# Patient Record
Sex: FEMALE | Race: OTHER | NOT HISPANIC OR LATINO | URBAN - METROPOLITAN AREA
[De-identification: names, ages, dates, MRNs, and addresses within clinical notes are randomized per-mention and may not be internally consistent; named-entity substitution may affect disease eponyms.]

---

## 2018-12-27 ENCOUNTER — OUTPATIENT (OUTPATIENT)
Dept: OUTPATIENT SERVICES | Facility: HOSPITAL | Age: 53
LOS: 1 days | End: 2018-12-27
Payer: COMMERCIAL

## 2018-12-27 VITALS
OXYGEN SATURATION: 99 % | HEIGHT: 67 IN | WEIGHT: 227.96 LBS | TEMPERATURE: 98 F | RESPIRATION RATE: 17 BRPM | HEART RATE: 79 BPM | DIASTOLIC BLOOD PRESSURE: 83 MMHG | SYSTOLIC BLOOD PRESSURE: 125 MMHG

## 2018-12-27 DIAGNOSIS — F17.200 NICOTINE DEPENDENCE, UNSPECIFIED, UNCOMPLICATED: ICD-10-CM

## 2018-12-27 DIAGNOSIS — M17.11 UNILATERAL PRIMARY OSTEOARTHRITIS, RIGHT KNEE: ICD-10-CM

## 2018-12-27 DIAGNOSIS — Z01.818 ENCOUNTER FOR OTHER PREPROCEDURAL EXAMINATION: ICD-10-CM

## 2018-12-27 DIAGNOSIS — Z90.49 ACQUIRED ABSENCE OF OTHER SPECIFIED PARTS OF DIGESTIVE TRACT: Chronic | ICD-10-CM

## 2018-12-27 DIAGNOSIS — E66.9 OBESITY, UNSPECIFIED: ICD-10-CM

## 2018-12-27 DIAGNOSIS — Z98.890 OTHER SPECIFIED POSTPROCEDURAL STATES: Chronic | ICD-10-CM

## 2018-12-27 LAB
HBA1C BLD-MCNC: 5.6 % — SIGNIFICANT CHANGE UP (ref 4–5.6)
MRSA PCR RESULT.: SIGNIFICANT CHANGE UP
S AUREUS DNA NOSE QL NAA+PROBE: SIGNIFICANT CHANGE UP

## 2018-12-27 PROCEDURE — G0463: CPT

## 2018-12-27 PROCEDURE — 86850 RBC ANTIBODY SCREEN: CPT

## 2018-12-27 PROCEDURE — 87641 MR-STAPH DNA AMP PROBE: CPT

## 2018-12-27 PROCEDURE — 83036 HEMOGLOBIN GLYCOSYLATED A1C: CPT

## 2018-12-27 PROCEDURE — 86900 BLOOD TYPING SEROLOGIC ABO: CPT

## 2018-12-27 PROCEDURE — 36415 COLL VENOUS BLD VENIPUNCTURE: CPT

## 2018-12-27 PROCEDURE — 86901 BLOOD TYPING SEROLOGIC RH(D): CPT

## 2018-12-27 PROCEDURE — 87640 STAPH A DNA AMP PROBE: CPT

## 2018-12-27 RX ORDER — SODIUM CHLORIDE 9 MG/ML
3 INJECTION INTRAMUSCULAR; INTRAVENOUS; SUBCUTANEOUS EVERY 8 HOURS
Qty: 0 | Refills: 0 | Status: DISCONTINUED | OUTPATIENT
Start: 2019-01-10 | End: 2019-01-12

## 2018-12-27 NOTE — H&P PST ADULT - NSANTHOSAYNRD_GEN_A_CORE
No. ROSALIE screening performed.  STOP BANG Legend: 0-2 = LOW Risk; 3-4 = INTERMEDIATE Risk; 5-8 = HIGH Risk

## 2018-12-27 NOTE — H&P PST ADULT - HISTORY OF PRESENT ILLNESS
53 year old female in generally good health obese presents with right knee osteoarthritis. Smoker and obesity presents with an injury at work in April 2016 her right knee while lifting heavy object heard a click and was unable to move. Pt used a cart to support herself and had ace wrapped for support. Pt went to ER in [place] ([facility]) had injection and referred to orthopedist. Pt than had different therapy treatment: injections, acupuncture gel, and physical therapy. Later on 12/2016 had arthroscopy on her right knee with relief for only a brief time. Pt had a repetition of the various therapies again with not much improvement. Pt was told of a knee replacement due to arthritis. Pt schedule for right knee replacement on 1/10/2019.

## 2019-01-10 ENCOUNTER — INPATIENT (INPATIENT)
Facility: HOSPITAL | Age: 54
LOS: 1 days | Discharge: HOME HEALTH | DRG: 470 | End: 2019-01-12
Attending: ORTHOPAEDIC SURGERY | Admitting: ORTHOPAEDIC SURGERY
Payer: COMMERCIAL

## 2019-01-10 VITALS
RESPIRATION RATE: 17 BRPM | OXYGEN SATURATION: 98 % | SYSTOLIC BLOOD PRESSURE: 141 MMHG | HEART RATE: 83 BPM | TEMPERATURE: 98 F | WEIGHT: 227.96 LBS | HEIGHT: 67 IN | DIASTOLIC BLOOD PRESSURE: 76 MMHG

## 2019-01-10 DIAGNOSIS — Z90.49 ACQUIRED ABSENCE OF OTHER SPECIFIED PARTS OF DIGESTIVE TRACT: Chronic | ICD-10-CM

## 2019-01-10 DIAGNOSIS — M17.11 UNILATERAL PRIMARY OSTEOARTHRITIS, RIGHT KNEE: ICD-10-CM

## 2019-01-10 DIAGNOSIS — E66.9 OBESITY, UNSPECIFIED: ICD-10-CM

## 2019-01-10 DIAGNOSIS — Z98.890 OTHER SPECIFIED POSTPROCEDURAL STATES: Chronic | ICD-10-CM

## 2019-01-10 DIAGNOSIS — F17.200 NICOTINE DEPENDENCE, UNSPECIFIED, UNCOMPLICATED: ICD-10-CM

## 2019-01-10 DIAGNOSIS — Z01.818 ENCOUNTER FOR OTHER PREPROCEDURAL EXAMINATION: ICD-10-CM

## 2019-01-10 LAB
ANION GAP SERPL CALC-SCNC: 8 MMOL/L — SIGNIFICANT CHANGE UP (ref 5–17)
BUN SERPL-MCNC: 8 MG/DL — SIGNIFICANT CHANGE UP (ref 7–18)
CALCIUM SERPL-MCNC: 8.5 MG/DL — SIGNIFICANT CHANGE UP (ref 8.4–10.5)
CHLORIDE SERPL-SCNC: 110 MMOL/L — HIGH (ref 96–108)
CO2 SERPL-SCNC: 27 MMOL/L — SIGNIFICANT CHANGE UP (ref 22–31)
CREAT SERPL-MCNC: 0.88 MG/DL — SIGNIFICANT CHANGE UP (ref 0.5–1.3)
GLUCOSE SERPL-MCNC: 82 MG/DL — SIGNIFICANT CHANGE UP (ref 70–99)
HCT VFR BLD CALC: 38.8 % — SIGNIFICANT CHANGE UP (ref 34.5–45)
HGB BLD-MCNC: 12.4 G/DL — SIGNIFICANT CHANGE UP (ref 11.5–15.5)
MCHC RBC-ENTMCNC: 31 PG — SIGNIFICANT CHANGE UP (ref 27–34)
MCHC RBC-ENTMCNC: 32 GM/DL — SIGNIFICANT CHANGE UP (ref 32–36)
MCV RBC AUTO: 96.9 FL — SIGNIFICANT CHANGE UP (ref 80–100)
PLATELET # BLD AUTO: 265 K/UL — SIGNIFICANT CHANGE UP (ref 150–400)
POTASSIUM SERPL-MCNC: 4.1 MMOL/L — SIGNIFICANT CHANGE UP (ref 3.5–5.3)
POTASSIUM SERPL-SCNC: 4.1 MMOL/L — SIGNIFICANT CHANGE UP (ref 3.5–5.3)
RBC # BLD: 4.01 M/UL — SIGNIFICANT CHANGE UP (ref 3.8–5.2)
RBC # FLD: 12.4 % — SIGNIFICANT CHANGE UP (ref 10.3–14.5)
SODIUM SERPL-SCNC: 145 MMOL/L — SIGNIFICANT CHANGE UP (ref 135–145)
WBC # BLD: 10.7 K/UL — HIGH (ref 3.8–10.5)
WBC # FLD AUTO: 10.7 K/UL — HIGH (ref 3.8–10.5)

## 2019-01-10 PROCEDURE — 73562 X-RAY EXAM OF KNEE 3: CPT | Mod: 26,RT

## 2019-01-10 PROCEDURE — 88311 DECALCIFY TISSUE: CPT | Mod: 26

## 2019-01-10 PROCEDURE — 88307 TISSUE EXAM BY PATHOLOGIST: CPT | Mod: 26

## 2019-01-10 RX ORDER — TRANEXAMIC ACID 100 MG/ML
1000 INJECTION, SOLUTION INTRAVENOUS ONCE
Qty: 0 | Refills: 0 | Status: DISCONTINUED | OUTPATIENT
Start: 2019-01-10 | End: 2019-01-10

## 2019-01-10 RX ORDER — GABAPENTIN 400 MG/1
100 CAPSULE ORAL ONCE
Qty: 0 | Refills: 0 | Status: COMPLETED | OUTPATIENT
Start: 2019-01-10 | End: 2019-01-10

## 2019-01-10 RX ORDER — FOLIC ACID 0.8 MG
1 TABLET ORAL DAILY
Qty: 0 | Refills: 0 | Status: DISCONTINUED | OUTPATIENT
Start: 2019-01-10 | End: 2019-01-12

## 2019-01-10 RX ORDER — SODIUM CHLORIDE 9 MG/ML
1000 INJECTION INTRAMUSCULAR; INTRAVENOUS; SUBCUTANEOUS
Qty: 0 | Refills: 0 | Status: DISCONTINUED | OUTPATIENT
Start: 2019-01-10 | End: 2019-01-12

## 2019-01-10 RX ORDER — ACETAMINOPHEN 500 MG
1000 TABLET ORAL ONCE
Qty: 0 | Refills: 0 | Status: COMPLETED | OUTPATIENT
Start: 2019-01-11 | End: 2019-01-11

## 2019-01-10 RX ORDER — TRAMADOL HYDROCHLORIDE 50 MG/1
50 TABLET ORAL ONCE
Qty: 0 | Refills: 0 | Status: DISCONTINUED | OUTPATIENT
Start: 2019-01-10 | End: 2019-01-10

## 2019-01-10 RX ORDER — OXYCODONE HYDROCHLORIDE 5 MG/1
5 TABLET ORAL EVERY 4 HOURS
Qty: 0 | Refills: 0 | Status: DISCONTINUED | OUTPATIENT
Start: 2019-01-10 | End: 2019-01-12

## 2019-01-10 RX ORDER — ONDANSETRON 8 MG/1
4 TABLET, FILM COATED ORAL EVERY 6 HOURS
Qty: 0 | Refills: 0 | Status: DISCONTINUED | OUTPATIENT
Start: 2019-01-10 | End: 2019-01-12

## 2019-01-10 RX ORDER — CELECOXIB 200 MG/1
200 CAPSULE ORAL ONCE
Qty: 0 | Refills: 0 | Status: COMPLETED | OUTPATIENT
Start: 2019-01-10 | End: 2019-01-10

## 2019-01-10 RX ORDER — DOCUSATE SODIUM 100 MG
100 CAPSULE ORAL THREE TIMES A DAY
Qty: 0 | Refills: 0 | Status: DISCONTINUED | OUTPATIENT
Start: 2019-01-10 | End: 2019-01-12

## 2019-01-10 RX ORDER — CEFAZOLIN SODIUM 1 G
1000 VIAL (EA) INJECTION EVERY 8 HOURS
Qty: 0 | Refills: 0 | Status: COMPLETED | OUTPATIENT
Start: 2019-01-10 | End: 2019-01-11

## 2019-01-10 RX ORDER — ASCORBIC ACID 60 MG
500 TABLET,CHEWABLE ORAL
Qty: 0 | Refills: 0 | Status: DISCONTINUED | OUTPATIENT
Start: 2019-01-10 | End: 2019-01-12

## 2019-01-10 RX ORDER — CHLORHEXIDINE GLUCONATE 213 G/1000ML
1 SOLUTION TOPICAL DAILY
Qty: 0 | Refills: 0 | Status: DISCONTINUED | OUTPATIENT
Start: 2019-01-10 | End: 2019-01-10

## 2019-01-10 RX ORDER — HYDROMORPHONE HYDROCHLORIDE 2 MG/ML
0.5 INJECTION INTRAMUSCULAR; INTRAVENOUS; SUBCUTANEOUS EVERY 4 HOURS
Qty: 0 | Refills: 0 | Status: DISCONTINUED | OUTPATIENT
Start: 2019-01-10 | End: 2019-01-12

## 2019-01-10 RX ORDER — TRAMADOL HYDROCHLORIDE 50 MG/1
50 TABLET ORAL EVERY 8 HOURS
Qty: 0 | Refills: 0 | Status: DISCONTINUED | OUTPATIENT
Start: 2019-01-10 | End: 2019-01-12

## 2019-01-10 RX ORDER — FERROUS SULFATE 325(65) MG
325 TABLET ORAL
Qty: 0 | Refills: 0 | Status: DISCONTINUED | OUTPATIENT
Start: 2019-01-10 | End: 2019-01-12

## 2019-01-10 RX ORDER — ACETAMINOPHEN 500 MG
1000 TABLET ORAL ONCE
Qty: 0 | Refills: 0 | Status: DISCONTINUED | OUTPATIENT
Start: 2019-01-10 | End: 2019-01-10

## 2019-01-10 RX ORDER — ENOXAPARIN SODIUM 100 MG/ML
30 INJECTION SUBCUTANEOUS EVERY 12 HOURS
Qty: 0 | Refills: 0 | Status: DISCONTINUED | OUTPATIENT
Start: 2019-01-11 | End: 2019-01-12

## 2019-01-10 RX ORDER — FAMOTIDINE 10 MG/ML
20 INJECTION INTRAVENOUS EVERY 12 HOURS
Qty: 0 | Refills: 0 | Status: DISCONTINUED | OUTPATIENT
Start: 2019-01-10 | End: 2019-01-12

## 2019-01-10 RX ORDER — SODIUM CHLORIDE 9 MG/ML
1000 INJECTION, SOLUTION INTRAVENOUS
Qty: 0 | Refills: 0 | Status: DISCONTINUED | OUTPATIENT
Start: 2019-01-10 | End: 2019-01-10

## 2019-01-10 RX ORDER — ACETAMINOPHEN 500 MG
650 TABLET ORAL EVERY 6 HOURS
Qty: 0 | Refills: 0 | Status: DISCONTINUED | OUTPATIENT
Start: 2019-01-10 | End: 2019-01-12

## 2019-01-10 RX ORDER — KETOROLAC TROMETHAMINE 30 MG/ML
15 SYRINGE (ML) INJECTION ONCE
Qty: 0 | Refills: 0 | Status: DISCONTINUED | OUTPATIENT
Start: 2019-01-10 | End: 2019-01-10

## 2019-01-10 RX ADMIN — HYDROMORPHONE HYDROCHLORIDE 0.5 MILLIGRAM(S): 2 INJECTION INTRAMUSCULAR; INTRAVENOUS; SUBCUTANEOUS at 22:14

## 2019-01-10 RX ADMIN — SODIUM CHLORIDE 120 MILLILITER(S): 9 INJECTION INTRAMUSCULAR; INTRAVENOUS; SUBCUTANEOUS at 20:07

## 2019-01-10 RX ADMIN — FAMOTIDINE 20 MILLIGRAM(S): 10 INJECTION INTRAVENOUS at 18:17

## 2019-01-10 RX ADMIN — GABAPENTIN 100 MILLIGRAM(S): 400 CAPSULE ORAL at 11:00

## 2019-01-10 RX ADMIN — Medication 1 MILLIGRAM(S): at 18:17

## 2019-01-10 RX ADMIN — Medication 100 MILLIGRAM(S): at 22:46

## 2019-01-10 RX ADMIN — CELECOXIB 200 MILLIGRAM(S): 200 CAPSULE ORAL at 10:59

## 2019-01-10 RX ADMIN — Medication 1 TABLET(S): at 18:17

## 2019-01-10 RX ADMIN — SODIUM CHLORIDE 3 MILLILITER(S): 9 INJECTION INTRAMUSCULAR; INTRAVENOUS; SUBCUTANEOUS at 22:15

## 2019-01-10 RX ADMIN — Medication 325 MILLIGRAM(S): at 18:17

## 2019-01-10 RX ADMIN — HYDROMORPHONE HYDROCHLORIDE 0.5 MILLIGRAM(S): 2 INJECTION INTRAMUSCULAR; INTRAVENOUS; SUBCUTANEOUS at 20:03

## 2019-01-10 RX ADMIN — OXYCODONE HYDROCHLORIDE 5 MILLIGRAM(S): 5 TABLET ORAL at 18:16

## 2019-01-10 RX ADMIN — SODIUM CHLORIDE 75 MILLILITER(S): 9 INJECTION, SOLUTION INTRAVENOUS at 15:50

## 2019-01-10 RX ADMIN — TRAMADOL HYDROCHLORIDE 50 MILLIGRAM(S): 50 TABLET ORAL at 11:00

## 2019-01-10 RX ADMIN — TRAMADOL HYDROCHLORIDE 50 MILLIGRAM(S): 50 TABLET ORAL at 23:56

## 2019-01-10 RX ADMIN — CHLORHEXIDINE GLUCONATE 1 APPLICATION(S): 213 SOLUTION TOPICAL at 11:00

## 2019-01-10 RX ADMIN — SODIUM CHLORIDE 3 MILLILITER(S): 9 INJECTION INTRAMUSCULAR; INTRAVENOUS; SUBCUTANEOUS at 10:54

## 2019-01-10 RX ADMIN — Medication 100 MILLIGRAM(S): at 22:45

## 2019-01-10 RX ADMIN — OXYCODONE HYDROCHLORIDE 5 MILLIGRAM(S): 5 TABLET ORAL at 19:04

## 2019-01-10 RX ADMIN — Medication 500 MILLIGRAM(S): at 18:17

## 2019-01-10 RX ADMIN — TRAMADOL HYDROCHLORIDE 50 MILLIGRAM(S): 50 TABLET ORAL at 22:46

## 2019-01-10 NOTE — PHYSICAL THERAPY INITIAL EVALUATION ADULT - ACTIVE RANGE OF MOTION EXAMINATION, REHAB EVAL
bilateral  lower extremity Active ROM was WFL (within functional limits)/bilateral upper extremity Active ROM was WFL (within functional limits)/R knee flex 0 degrees

## 2019-01-10 NOTE — PHYSICAL THERAPY INITIAL EVALUATION ADULT - DIAGNOSIS, PT EVAL
Patient presents with decreased strength, endurance, balance and increased pain that limits ability to perform all functional mobility

## 2019-01-10 NOTE — PHYSICAL THERAPY INITIAL EVALUATION ADULT - LIVES WITH, PROFILE
children/like with son and daughter in first floor apartment with 3 steps to enter and 3 steps inside children/lives with son and daughter in first floor apartment with 3 steps to enter and 3 steps inside

## 2019-01-10 NOTE — PHYSICAL THERAPY INITIAL EVALUATION ADULT - CRITERIA FOR SKILLED THERAPEUTIC INTERVENTIONS
functional limitations in following categories/rehab potential/predicted duration of therapy intervention/impairments found/therapy frequency/anticipated discharge recommendation

## 2019-01-10 NOTE — PHYSICAL THERAPY INITIAL EVALUATION ADULT - RANGE OF MOTION EXAMINATION, REHAB EVAL
bilateral upper extremity ROM was WFL (within functional limits)/bilateral lower extremity ROM was WFL (within functional limits)/R knee flex 0

## 2019-01-10 NOTE — PROGRESS NOTE ADULT - SUBJECTIVE AND OBJECTIVE BOX
Patient was seen and examined  Patient is a 53y old  Female who presents with a chief complaint of r knee pain     INTERVAL HPI/OVERNIGHT EVENTS:  T(C): 36.3 (01-10-19 @ 16:42), Max: 36.4 (01-10-19 @ 10:40)  HR: 86 (01-10-19 @ 18:04) (79 - 86)  BP: 148/80 (01-10-19 @ 18:04) (112/64 - 148/80)  RR: 16 (01-10-19 @ 18:04) (14 - 18)  SpO2: 99% (01-10-19 @ 18:04) (96% - 100%)  Wt(kg): --  I&O's Summary    10 Leonidas 2019 07:01  -  10 Leonidas 2019 21:03  --------------------------------------------------------  IN: 900 mL / OUT: 0 mL / NET: 900 mL        LABS:                        12.4   10.7  )-----------( 265      ( 10 Leonidas 2019 15:26 )             38.8     01-10    145  |  110<H>  |  8   ----------------------------<  82  4.1   |  27  |  0.88    Ca    8.5      10 Leonidas 2019 15:26          CAPILLARY BLOOD GLUCOSE                  MEDICATIONS  (STANDING):  ascorbic acid 500 milliGRAM(s) Oral two times a day  ceFAZolin   IVPB 1000 milliGRAM(s) IV Intermittent every 8 hours  docusate sodium 100 milliGRAM(s) Oral three times a day  famotidine    Tablet 20 milliGRAM(s) Oral every 12 hours  ferrous    sulfate 325 milliGRAM(s) Oral three times a day with meals  folic acid 1 milliGRAM(s) Oral daily  multivitamin 1 Tablet(s) Oral daily  sodium chloride 0.9% lock flush 3 milliLiter(s) IV Push every 8 hours  sodium chloride 0.9%. 1000 milliLiter(s) (120 mL/Hr) IV Continuous <Continuous>  traMADol 50 milliGRAM(s) Oral every 8 hours    MEDICATIONS  (PRN):  acetaminophen   Tablet .. 650 milliGRAM(s) Oral every 6 hours PRN Temp greater or equal to 38C (100.4F)  aluminum hydroxide/magnesium hydroxide/simethicone Suspension 30 milliLiter(s) Oral four times a day PRN Indigestion  HYDROmorphone  Injectable 0.5 milliGRAM(s) IV Push every 4 hours PRN Severe Pain (7 - 10)  ondansetron Injectable 4 milliGRAM(s) IV Push every 6 hours PRN Nausea and/or Vomiting  oxyCODONE    IR 5 milliGRAM(s) Oral every 4 hours PRN Mild Pain (1 - 3)      RADIOLOGY & ADDITIONAL TESTS:    Imaging Personally Reviewed:  [ ] YES  [ ] NO    REVIEW OF SYSTEMS:  CONSTITUTIONAL: No fever, weight loss, or fatigue  EYES: No eye pain, visual disturbances, or discharge  ENMT:  No difficulty hearing, tinnitus, vertigo; No sinus or throat pain  NECK: No pain or stiffness  BREASTS: No pain, masses, or nipple discharge  RESPIRATORY: No cough, wheezing, chills or hemoptysis; No shortness of breath  CARDIOVASCULAR: No chest pain, palpitations, dizziness, or leg swelling  GASTROINTESTINAL: No abdominal or epigastric pain. No nausea, vomiting, or hematemesis; No diarrhea or constipation. No melena or hematochezia.  GENITOURINARY: No dysuria, frequency, hematuria, or incontinence  NEUROLOGICAL: No headaches, memory loss, loss of strength, numbness, or tremors  SKIN: No itching, burning, rashes, or lesions   LYMPH NODES: No enlarged glands  ENDOCRINE: No heat or cold intolerance; No hair loss  MUSCULOSKELETAL: r knee pain   PSYCHIATRIC: No depression, anxiety, mood swings, or difficulty sleeping  HEME/LYMPH: No easy bruising, or bleeding gums  ALLERY AND IMMUNOLOGIC: No hives or eczema      Consultant(s) Notes Reviewed:  [ ] YES  [ ] NO    PHYSICAL EXAM:  GENERAL: NAD, well-groomed, well-developed  HEAD:  Atraumatic, Normocephalic  EYES: EOMI, PERRLA, conjunctiva and sclera clear  ENMT: No tonsillar erythema, exudates, or enlargement; Moist mucous membranes, Good dentition, No lesions  NECK: Supple, No JVD, Normal thyroid  NERVOUS SYSTEM:  Alert & Oriented X3, Good concentration; Motor Strength 5/5 B/L upper and lower extremities; DTRs 2+ intact and symmetric  CHEST/LUNG: Clear to percussion bilaterally; No rales, rhonchi, wheezing, or rubs  HEART: Regular rate and rhythm; No murmurs, rubs, or gallops  ABDOMEN: Soft, Nontender, Nondistended; Bowel sounds present  EXTREMITIES:  r knee surgery   LYMPH: No lymphadenopathy noted  SKIN: No rashes or lesions    Care Discussed with Consultants/Other Providers [ x] YES  [ ] NO

## 2019-01-11 DIAGNOSIS — G89.18 OTHER ACUTE POSTPROCEDURAL PAIN: ICD-10-CM

## 2019-01-11 DIAGNOSIS — T84.84XA PAIN DUE TO INTERNAL ORTHOPEDIC PROSTHETIC DEVICES, IMPLANTS AND GRAFTS, INITIAL ENCOUNTER: ICD-10-CM

## 2019-01-11 LAB
ANION GAP SERPL CALC-SCNC: 8 MMOL/L — SIGNIFICANT CHANGE UP (ref 5–17)
BUN SERPL-MCNC: 6 MG/DL — LOW (ref 7–18)
CALCIUM SERPL-MCNC: 8.3 MG/DL — LOW (ref 8.4–10.5)
CHLORIDE SERPL-SCNC: 104 MMOL/L — SIGNIFICANT CHANGE UP (ref 96–108)
CO2 SERPL-SCNC: 26 MMOL/L — SIGNIFICANT CHANGE UP (ref 22–31)
CREAT SERPL-MCNC: 0.63 MG/DL — SIGNIFICANT CHANGE UP (ref 0.5–1.3)
GLUCOSE SERPL-MCNC: 122 MG/DL — HIGH (ref 70–99)
HCT VFR BLD CALC: 35.9 % — SIGNIFICANT CHANGE UP (ref 34.5–45)
HGB BLD-MCNC: 11.9 G/DL — SIGNIFICANT CHANGE UP (ref 11.5–15.5)
MCHC RBC-ENTMCNC: 30.9 PG — SIGNIFICANT CHANGE UP (ref 27–34)
MCHC RBC-ENTMCNC: 33.3 GM/DL — SIGNIFICANT CHANGE UP (ref 32–36)
MCV RBC AUTO: 93 FL — SIGNIFICANT CHANGE UP (ref 80–100)
PLATELET # BLD AUTO: 267 K/UL — SIGNIFICANT CHANGE UP (ref 150–400)
POTASSIUM SERPL-MCNC: 3.8 MMOL/L — SIGNIFICANT CHANGE UP (ref 3.5–5.3)
POTASSIUM SERPL-SCNC: 3.8 MMOL/L — SIGNIFICANT CHANGE UP (ref 3.5–5.3)
RBC # BLD: 3.86 M/UL — SIGNIFICANT CHANGE UP (ref 3.8–5.2)
RBC # FLD: 11.6 % — SIGNIFICANT CHANGE UP (ref 10.3–14.5)
SODIUM SERPL-SCNC: 138 MMOL/L — SIGNIFICANT CHANGE UP (ref 135–145)
WBC # BLD: 12 K/UL — HIGH (ref 3.8–10.5)
WBC # FLD AUTO: 12 K/UL — HIGH (ref 3.8–10.5)

## 2019-01-11 PROCEDURE — 99223 1ST HOSP IP/OBS HIGH 75: CPT

## 2019-01-11 RX ORDER — KETOROLAC TROMETHAMINE 30 MG/ML
30 SYRINGE (ML) INJECTION EVERY 6 HOURS
Qty: 0 | Refills: 0 | Status: DISCONTINUED | OUTPATIENT
Start: 2019-01-11 | End: 2019-01-12

## 2019-01-11 RX ADMIN — FAMOTIDINE 20 MILLIGRAM(S): 10 INJECTION INTRAVENOUS at 18:10

## 2019-01-11 RX ADMIN — TRAMADOL HYDROCHLORIDE 50 MILLIGRAM(S): 50 TABLET ORAL at 14:15

## 2019-01-11 RX ADMIN — Medication 325 MILLIGRAM(S): at 11:55

## 2019-01-11 RX ADMIN — TRAMADOL HYDROCHLORIDE 50 MILLIGRAM(S): 50 TABLET ORAL at 22:51

## 2019-01-11 RX ADMIN — TRAMADOL HYDROCHLORIDE 50 MILLIGRAM(S): 50 TABLET ORAL at 06:40

## 2019-01-11 RX ADMIN — Medication 400 MILLIGRAM(S): at 06:05

## 2019-01-11 RX ADMIN — Medication 100 MILLIGRAM(S): at 13:21

## 2019-01-11 RX ADMIN — OXYCODONE HYDROCHLORIDE 5 MILLIGRAM(S): 5 TABLET ORAL at 08:45

## 2019-01-11 RX ADMIN — Medication 325 MILLIGRAM(S): at 08:43

## 2019-01-11 RX ADMIN — SODIUM CHLORIDE 3 MILLILITER(S): 9 INJECTION INTRAMUSCULAR; INTRAVENOUS; SUBCUTANEOUS at 05:58

## 2019-01-11 RX ADMIN — ENOXAPARIN SODIUM 30 MILLIGRAM(S): 100 INJECTION SUBCUTANEOUS at 17:46

## 2019-01-11 RX ADMIN — OXYCODONE HYDROCHLORIDE 5 MILLIGRAM(S): 5 TABLET ORAL at 18:40

## 2019-01-11 RX ADMIN — TRAMADOL HYDROCHLORIDE 50 MILLIGRAM(S): 50 TABLET ORAL at 23:38

## 2019-01-11 RX ADMIN — Medication 1000 MILLIGRAM(S): at 06:39

## 2019-01-11 RX ADMIN — Medication 1 MILLIGRAM(S): at 11:56

## 2019-01-11 RX ADMIN — ENOXAPARIN SODIUM 30 MILLIGRAM(S): 100 INJECTION SUBCUTANEOUS at 06:08

## 2019-01-11 RX ADMIN — Medication 1000 MILLIGRAM(S): at 23:38

## 2019-01-11 RX ADMIN — TRAMADOL HYDROCHLORIDE 50 MILLIGRAM(S): 50 TABLET ORAL at 13:21

## 2019-01-11 RX ADMIN — Medication 30 MILLIGRAM(S): at 11:58

## 2019-01-11 RX ADMIN — HYDROMORPHONE HYDROCHLORIDE 0.5 MILLIGRAM(S): 2 INJECTION INTRAMUSCULAR; INTRAVENOUS; SUBCUTANEOUS at 00:07

## 2019-01-11 RX ADMIN — Medication 500 MILLIGRAM(S): at 06:09

## 2019-01-11 RX ADMIN — SODIUM CHLORIDE 3 MILLILITER(S): 9 INJECTION INTRAMUSCULAR; INTRAVENOUS; SUBCUTANEOUS at 13:21

## 2019-01-11 RX ADMIN — Medication 30 MILLIGRAM(S): at 12:13

## 2019-01-11 RX ADMIN — OXYCODONE HYDROCHLORIDE 5 MILLIGRAM(S): 5 TABLET ORAL at 09:45

## 2019-01-11 RX ADMIN — TRAMADOL HYDROCHLORIDE 50 MILLIGRAM(S): 50 TABLET ORAL at 06:09

## 2019-01-11 RX ADMIN — Medication 30 MILLIGRAM(S): at 21:19

## 2019-01-11 RX ADMIN — Medication 100 MILLIGRAM(S): at 21:22

## 2019-01-11 RX ADMIN — Medication 100 MILLIGRAM(S): at 06:09

## 2019-01-11 RX ADMIN — Medication 30 MILLIGRAM(S): at 22:00

## 2019-01-11 RX ADMIN — Medication 400 MILLIGRAM(S): at 14:06

## 2019-01-11 RX ADMIN — Medication 1 TABLET(S): at 11:56

## 2019-01-11 RX ADMIN — Medication 100 MILLIGRAM(S): at 06:07

## 2019-01-11 RX ADMIN — Medication 400 MILLIGRAM(S): at 22:48

## 2019-01-11 RX ADMIN — SODIUM CHLORIDE 3 MILLILITER(S): 9 INJECTION INTRAMUSCULAR; INTRAVENOUS; SUBCUTANEOUS at 21:23

## 2019-01-11 RX ADMIN — HYDROMORPHONE HYDROCHLORIDE 0.5 MILLIGRAM(S): 2 INJECTION INTRAMUSCULAR; INTRAVENOUS; SUBCUTANEOUS at 00:56

## 2019-01-11 RX ADMIN — Medication 325 MILLIGRAM(S): at 17:43

## 2019-01-11 RX ADMIN — Medication 500 MILLIGRAM(S): at 17:43

## 2019-01-11 RX ADMIN — Medication 1000 MILLIGRAM(S): at 14:21

## 2019-01-11 RX ADMIN — FAMOTIDINE 20 MILLIGRAM(S): 10 INJECTION INTRAVENOUS at 06:09

## 2019-01-11 RX ADMIN — OXYCODONE HYDROCHLORIDE 5 MILLIGRAM(S): 5 TABLET ORAL at 17:42

## 2019-01-11 NOTE — CONSULT NOTE ADULT - SUBJECTIVE AND OBJECTIVE BOX
ERICA BURNS  53y  Female      Patient is a 53y old  Female who presents with a chief complaint of       PAST MEDICAL/SURGICAL HISTORY  PAST MEDICAL & SURGICAL HISTORY:  Obesity (BMI 35.0-39.9 without comorbidity)  Smoker  Primary osteoarthritis of right knee  S/P laparoscopic cholecystectomy: June 2018  H/O arthroscopy of right knee      REVIEW OF SYSTEMS:  CONSTITUTIONAL: No fever, weight loss, or fatigue  RESPIRATORY: No cough, wheezing, chills or hemoptysis; No shortness of breath  CARDIOVASCULAR: No chest pain, palpitations, dizziness, or leg swelling  GASTROINTESTINAL: No abdominal or epigastric pain. No nausea, vomiting, or hematemesis; No diarrhea or constipation  GENITOURINARY: No dysuria, frequency, hematuria, or incontinence  NEUROLOGICAL: No headaches, memory loss, loss of strength, numbness, or tremors  SKIN: No itching, burning, rashes, or lesions   MUSCULOSKELETAL: No joint pain or swelling; No muscle, back, or extremity pain  PSYCHIATRIC: No depression, anxiety, mood swings, or difficulty sleeping    T(C): 36.9 (01-11-19 @ 14:22), Max: 36.9 (01-11-19 @ 05:51)  HR: 94 (01-11-19 @ 14:22) (81 - 95)  BP: 145/75 (01-11-19 @ 14:22) (112/64 - 148/82)  RR: 18 (01-11-19 @ 14:22) (16 - 18)  SpO2: 100% (01-11-19 @ 14:22) (95% - 100%)  Wt(kg): --Vital Signs Last 24 Hrs  T(C): 36.9 (11 Jan 2019 14:22), Max: 36.9 (11 Jan 2019 05:51)  T(F): 98.4 (11 Jan 2019 14:22), Max: 98.5 (11 Jan 2019 05:51)  HR: 94 (11 Jan 2019 14:22) (81 - 95)  BP: 145/75 (11 Jan 2019 14:22) (112/64 - 148/82)  BP(mean): --  RR: 18 (11 Jan 2019 14:22) (16 - 18)  SpO2: 100% (11 Jan 2019 14:22) (95% - 100%)    PHYSICAL EXAM:  GENERAL: NAD, well-groomed, well-developed  HEAD:  Atraumatic, Normocephalic  NECK: Supple, No JVD, Normal thyroid  NERVOUS SYSTEM:  Alert & Oriented X3, Good concentration;   CHEST/LUNG: Clear to percussion bilaterally; No rales, rhonchi, wheezing, or rubs  HEART: Regular rate and rhythm; No murmurs, rubs, or gallops  ABDOMEN: Soft, Nontender, Nondistended; Bowel sounds present  EXTREMITIES:  2+ Peripheral Pulses, No clubbing, cyanosis, or edema  MUSCULOSKELETAL:  SKIN: No rashes or lesions    Consultant(s) Notes Reviewed:  [x ] YES  [ ] NO  Care Discussed with Consultants/Other Providers [ x] YES  [ ] NO  ISTOP [ ] Yes  [  ] No      LABS:  CBC   01-11-19 @ 08:38  Hematcorit 35.9  Hemoglobin 11.9  Mean Cell Hemoglobin 30.9  Platelet Count-Automated 267  RBC Count 3.86  Red Cell Distrib Width 11.6  Wbc Count 12.0      BMP  01-11-19 @ 08:38  Anion Gap. Serum 8  Blood Urea Nitrogen,Serm 6  Calcium, Total Serum 8.3  Carbon Dioxide, Serum 26  Chloride, Serum 104  Creatinine, Serum 0.63  eGFR in  118  eGFR in Non Afican American 102  Gloucose, serum 122  Potassium, Serum 3.8  Sodium, Serum 138              01-10-19 @ 15:26  Anion Gap. Serum 8  Blood Urea Nitrogen,Serm 8  Calcium, Total Serum 8.5  Carbon Dioxide, Serum 27  Chloride, Serum 110  Creatinine, Serum 0.88  eGFR in  86  eGFR in Non Afican American 74  Gloucose, serum 82  Potassium, Serum 4.1  Sodium, Serum 145                  CMP  01-11-19 @ 08:38  Flora Aminotransferase(ALT/SGPT)--  Albumin, Serum --  Alkaline Phosphatase, Serum --  Anion Gap, Serum 8  Aspartate Aminotransferase (AST/SGOT)--  Bilirubin Total, Serum --  Blood Urea Nitrogen, Serum 6  Calcium,Total Serum 8.3  Carbon Dioxide, Serum 26  Chloride, Serum 104  Creatinine, Serum 0.63  eGFR if  118  eGFR if Non African American 102  Glucose, Serum 122  Potassium, Serum 3.8  Protein Total, Serum --  Sodium, Serum 138                      01-10-19 @ 15:26  Flora Aminotransferase(ALT/SGPT)--  Albumin, Serum --  Alkaline Phosphatase, Serum --  Anion Gap, Serum 8  Aspartate Aminotransferase (AST/SGOT)--  Bilirubin Total, Serum --  Blood Urea Nitrogen, Serum 8  Calcium,Total Serum 8.5  Carbon Dioxide, Serum 27  Chloride, Serum 110  Creatinine, Serum 0.88  eGFR if  86  eGFR if Non African American 74  Glucose, Serum 82  Potassium, Serum 4.1  Protein Total, Serum --  Sodium, Serum 145                          PT/INR      Amylase/Lipase            RADIOLOGY & ADDITIONAL TESTS:    Imaging Personally Reviewed:  [ ] YES  [ ] NO ERICA BURNS  53y  Female      Patient is a 53y old  Female who presents with a chief complaint of right knee pain. Pt is s/p right knee replacement, pod#1. Pt complaining of right knee pain.  Pain radiates to back of knee.  No nausea or vomiting.  No chest pain or sob.  Pt was oob to chair this morning.        PAST MEDICAL/SURGICAL HISTORY  PAST MEDICAL & SURGICAL HISTORY:  Obesity (BMI 35.0-39.9 without comorbidity)  Smoker  Primary osteoarthritis of right knee  S/P laparoscopic cholecystectomy: June 2018  H/O arthroscopy of right knee    SOCIAL HISTORY   - No smoking, etoh or drug use    REVIEW OF SYSTEMS:  CONSTITUTIONAL: No fever, weight loss, or fatigue  RESPIRATORY: No cough, wheezing, chills or hemoptysis; No shortness of breath  CARDIOVASCULAR: No chest pain, palpitations, dizziness, or leg swelling  GASTROINTESTINAL: No abdominal or epigastric pain. No nausea, vomiting, or hematemesis; No diarrhea or constipation  GENITOURINARY: No dysuria, frequency, hematuria, or incontinence  NEUROLOGICAL: No headaches, memory loss, loss of strength, numbness, or tremors  SKIN: No itching, burning, rashes, or lesions   MUSCULOSKELETAL: + right knee pain    T(C): 36.9 (01-11-19 @ 14:22), Max: 36.9 (01-11-19 @ 05:51)  HR: 94 (01-11-19 @ 14:22) (81 - 95)  BP: 145/75 (01-11-19 @ 14:22) (112/64 - 148/82)  RR: 18 (01-11-19 @ 14:22) (16 - 18)  SpO2: 100% (01-11-19 @ 14:22) (95% - 100%)  Wt(kg): --Vital Signs Last 24 Hrs  T(C): 36.9 (11 Jan 2019 14:22), Max: 36.9 (11 Jan 2019 05:51)  T(F): 98.4 (11 Jan 2019 14:22), Max: 98.5 (11 Jan 2019 05:51)  HR: 94 (11 Jan 2019 14:22) (81 - 95)  BP: 145/75 (11 Jan 2019 14:22) (112/64 - 148/82)  BP(mean): --  RR: 18 (11 Jan 2019 14:22) (16 - 18)  SpO2: 100% (11 Jan 2019 14:22) (95% - 100%)    PHYSICAL EXAM:  GENERAL: NAD, well-groomed, well-developed  NERVOUS SYSTEM:  Alert & Oriented X3, Good concentration;   CHEST/LUNG: Clear to percussion bilaterally; No rales, rhonchi, wheezing, or rubs  HEART: Regular rate and rhythm; No murmurs, rubs, or gallops  ABDOMEN: Soft, Nontender, Nondistended; Bowel sounds present  EXTREMITIES:  2+ Peripheral Pulses, No clubbing, cyanosis, or edema  MUSCULOSKELETAL: + right knee tenderness, + decreased rom       Consultant(s) Notes Reviewed:  [x ] YES  [ ] NO  Care Discussed with Consultants/Other Providers [ x] YES  [ ] NO  ISTOP [ ] Yes  [  ] No      LABS:  CBC   01-11-19 @ 08:38  Hematcorit 35.9  Hemoglobin 11.9  Mean Cell Hemoglobin 30.9  Platelet Count-Automated 267  RBC Count 3.86  Red Cell Distrib Width 11.6  Wbc Count 12.0      BMP  01-11-19 @ 08:38  Anion Gap. Serum 8  Blood Urea Nitrogen,Serm 6  Calcium, Total Serum 8.3  Carbon Dioxide, Serum 26  Chloride, Serum 104  Creatinine, Serum 0.63  eGFR in  118  eGFR in Non Afican American 102  Gloucose, serum 122  Potassium, Serum 3.8  Sodium, Serum 138              01-10-19 @ 15:26  Anion Gap. Serum 8  Blood Urea Nitrogen,Serm 8  Calcium, Total Serum 8.5  Carbon Dioxide, Serum 27  Chloride, Serum 110  Creatinine, Serum 0.88  eGFR in  86  eGFR in Non Afican American 74  Gloucose, serum 82  Potassium, Serum 4.1  Sodium, Serum 145                  CMP  01-11-19 @ 08:38  Flora Aminotransferase(ALT/SGPT)--  Albumin, Serum --  Alkaline Phosphatase, Serum --  Anion Gap, Serum 8  Aspartate Aminotransferase (AST/SGOT)--  Bilirubin Total, Serum --  Blood Urea Nitrogen, Serum 6  Calcium,Total Serum 8.3  Carbon Dioxide, Serum 26  Chloride, Serum 104  Creatinine, Serum 0.63  eGFR if  118  eGFR if Non African American 102  Glucose, Serum 122  Potassium, Serum 3.8  Protein Total, Serum --  Sodium, Serum 138                      01-10-19 @ 15:26  Flora Aminotransferase(ALT/SGPT)--  Albumin, Serum --  Alkaline Phosphatase, Serum --  Anion Gap, Serum 8  Aspartate Aminotransferase (AST/SGOT)--  Bilirubin Total, Serum --  Blood Urea Nitrogen, Serum 8  Calcium,Total Serum 8.5  Carbon Dioxide, Serum 27  Chloride, Serum 110  Creatinine, Serum 0.88  eGFR if  86  eGFR if Non African American 74  Glucose, Serum 82  Potassium, Serum 4.1  Protein Total, Serum --  Sodium, Serum 145                          PT/INR      Amylase/Lipase            RADIOLOGY & ADDITIONAL TESTS:    Imaging Personally Reviewed:  [ ] YES  [ ] NO

## 2019-01-11 NOTE — PROGRESS NOTE ADULT - SUBJECTIVE AND OBJECTIVE BOX
53yFemale    Diagnosis:  S/p R Total Knee Replacement POD#1    Patient was seen and evaluated at bedside. Patient with no acute complaints.   Pain is well controlled.  Denies CP/SOB, dyspnea, paresthesias, N/V/D, palpitations.     Vital Signs Last 24 Hrs  T(C): 36.9 (11 Jan 2019 05:51), Max: 36.9 (11 Jan 2019 05:51)  T(F): 98.5 (11 Jan 2019 05:51), Max: 98.5 (11 Jan 2019 05:51)  HR: 94 (11 Jan 2019 05:51) (79 - 95)  BP: 148/82 (11 Jan 2019 05:51) (112/64 - 148/82)  BP(mean): --  RR: 18 (11 Jan 2019 05:51) (14 - 18)  SpO2: 95% (11 Jan 2019 05:51) (95% - 100%)  I&O's Detail    10 Leonidas 2019 07:01  -  11 Jan 2019 07:00  --------------------------------------------------------  IN:    Lactated Ringers IV Bolus: 900 mL  Total IN: 900 mL    OUT:  Total OUT: 0 mL    Total NET: 900 mL          Physical Exam:    General: AAOx3, NAD, resting comfortably in bed.    R KNEE:  Dressing is C/D/I. Skin is pink and warm. No erythema. SILT.  Wound with no drainage, healing well.   Lower extremity:  No calf tenderness, calves are soft. 2+pulses. NVI. 5/5 Strength of EHL/TA/gastrocnemius B/L.  Good capillary refill.                           12.4   10.7  )-----------( 265      ( 10 Leonidas 2019 15:26 )             38.8     01-10    145  |  110<H>  |  8   ----------------------------<  82  4.1   |  27  |  0.88    Ca    8.5      10 Leonidas 2019 15:26        Impression:  53yFemale S/p R Total Knee Replacement POD#1  Plan:  -  Pain management  -  Dvt prophylaxis with Lovenox  -  Daily Physical Theapy:  WBAT of right lower extremity  -  Continue with heel bump when laying in bed  -  Discharge planning: Home Vs. Rehab pending Physical therapy eval.  -  Continue with Post-op Antibiotics x 24hrs

## 2019-01-11 NOTE — PROGRESS NOTE ADULT - SUBJECTIVE AND OBJECTIVE BOX
Patient was seen and examined  Patient is a 53y old  Female who presents with a chief complaint of     INTERVAL HPI/OVERNIGHT EVENTS:  T(C): 36.9 (01-11-19 @ 05:51), Max: 36.9 (01-11-19 @ 05:51)  HR: 86 (01-11-19 @ 10:12) (79 - 95)  BP: 127/70 (01-11-19 @ 10:12) (112/64 - 148/82)  RR: 18 (01-11-19 @ 05:51) (14 - 18)  SpO2: 96% (01-11-19 @ 10:12) (95% - 100%)  Wt(kg): --  I&O's Summary    10 Leonidas 2019 07:01  -  11 Jan 2019 07:00  --------------------------------------------------------  IN: 900 mL / OUT: 0 mL / NET: 900 mL        LABS:                        11.9   12.0  )-----------( 267      ( 11 Jan 2019 08:38 )             35.9     01-11    138  |  104  |  6<L>  ----------------------------<  122<H>  3.8   |  26  |  0.63    Ca    8.3<L>      11 Jan 2019 08:38        MEDICATIONS  (STANDING):  acetaminophen  IVPB .. 1000 milliGRAM(s) IV Intermittent once  acetaminophen  IVPB .. 1000 milliGRAM(s) IV Intermittent once  ascorbic acid 500 milliGRAM(s) Oral two times a day  docusate sodium 100 milliGRAM(s) Oral three times a day  enoxaparin Injectable 30 milliGRAM(s) SubCutaneous every 12 hours  famotidine    Tablet 20 milliGRAM(s) Oral every 12 hours  ferrous    sulfate 325 milliGRAM(s) Oral three times a day with meals  folic acid 1 milliGRAM(s) Oral daily  multivitamin 1 Tablet(s) Oral daily  sodium chloride 0.9% lock flush 3 milliLiter(s) IV Push every 8 hours  sodium chloride 0.9%. 1000 milliLiter(s) (120 mL/Hr) IV Continuous <Continuous>  traMADol 50 milliGRAM(s) Oral every 8 hours    MEDICATIONS  (PRN):  acetaminophen   Tablet .. 650 milliGRAM(s) Oral every 6 hours PRN Temp greater or equal to 38C (100.4F)  aluminum hydroxide/magnesium hydroxide/simethicone Suspension 30 milliLiter(s) Oral four times a day PRN Indigestion  HYDROmorphone  Injectable 0.5 milliGRAM(s) IV Push every 4 hours PRN Severe Pain (7 - 10)  ondansetron Injectable 4 milliGRAM(s) IV Push every 6 hours PRN Nausea and/or Vomiting  oxyCODONE    IR 5 milliGRAM(s) Oral every 4 hours PRN Mild Pain (1 - 3)      RADIOLOGY & ADDITIONAL TESTS:    Imaging Personally Reviewed:  [ ] YES  [ ] NO    REVIEW OF SYSTEMS:  CONSTITUTIONAL: No fever, weight loss, or fatigue  EYES: No eye pain, visual disturbances, or discharge  ENMT:  No difficulty hearing, tinnitus, vertigo; No sinus or throat pain  NECK: No pain or stiffness  BREASTS: No pain, masses, or nipple discharge  RESPIRATORY: No cough, wheezing, chills or hemoptysis; No shortness of breath  CARDIOVASCULAR: No chest pain, palpitations, dizziness, or leg swelling  GASTROINTESTINAL: No abdominal or epigastric pain. No nausea, vomiting, or hematemesis; No diarrhea or constipation. No melena or hematochezia.  GENITOURINARY: No dysuria, frequency, hematuria, or incontinence  NEUROLOGICAL: No headaches, memory loss, loss of strength, numbness, or tremors  SKIN: No itching, burning, rashes, or lesions   LYMPH NODES: No enlarged glands  ENDOCRINE: No heat or cold intolerance; No hair loss  MUSCULOSKELETAL: No joint pain or swelling; No muscle, back, or extremity pain  PSYCHIATRIC: No depression, anxiety, mood swings, or difficulty sleeping  HEME/LYMPH: No easy bruising, or bleeding gums  ALLERY AND IMMUNOLOGIC: No hives or eczema      Consultant(s) Notes Reviewed:  [ ] YES  [ ] NO    PHYSICAL EXAM:  GENERAL: NAD, well-groomed, well-developed  HEAD:  Atraumatic, Normocephalic  EYES: EOMI, PERRLA, conjunctiva and sclera clear  ENMT: No tonsillar erythema, exudates, or enlargement; Moist mucous membranes, Good dentition, No lesions  NECK: Supple, No JVD, Normal thyroid  NERVOUS SYSTEM:  Alert & Oriented X3, Good concentration; Motor Strength 5/5 B/L upper and lower extremities; DTRs 2+ intact and symmetric  CHEST/LUNG: Clear to percussion bilaterally; No rales, rhonchi, wheezing, or rubs  HEART: Regular rate and rhythm; No murmurs, rubs, or gallops  ABDOMEN: Soft, Nontender, Nondistended; Bowel sounds present  EXTREMITIES: r knee dressing   LYMPH: No lymphadenopathy noted  SKIN: No rashes or lesions    Care Discussed with Consultants/Other Providers [ x] YES  [ ] NO

## 2019-01-12 VITALS
DIASTOLIC BLOOD PRESSURE: 85 MMHG | TEMPERATURE: 98 F | OXYGEN SATURATION: 100 % | RESPIRATION RATE: 18 BRPM | HEART RATE: 100 BPM | SYSTOLIC BLOOD PRESSURE: 126 MMHG

## 2019-01-12 LAB
ANION GAP SERPL CALC-SCNC: 5 MMOL/L — SIGNIFICANT CHANGE UP (ref 5–17)
BUN SERPL-MCNC: 7 MG/DL — SIGNIFICANT CHANGE UP (ref 7–18)
CALCIUM SERPL-MCNC: 8.3 MG/DL — LOW (ref 8.4–10.5)
CHLORIDE SERPL-SCNC: 106 MMOL/L — SIGNIFICANT CHANGE UP (ref 96–108)
CO2 SERPL-SCNC: 29 MMOL/L — SIGNIFICANT CHANGE UP (ref 22–31)
CREAT SERPL-MCNC: 0.67 MG/DL — SIGNIFICANT CHANGE UP (ref 0.5–1.3)
GLUCOSE SERPL-MCNC: 107 MG/DL — HIGH (ref 70–99)
HCT VFR BLD CALC: 34.6 % — SIGNIFICANT CHANGE UP (ref 34.5–45)
HGB BLD-MCNC: 11.5 G/DL — SIGNIFICANT CHANGE UP (ref 11.5–15.5)
MCHC RBC-ENTMCNC: 31.5 PG — SIGNIFICANT CHANGE UP (ref 27–34)
MCHC RBC-ENTMCNC: 33.3 GM/DL — SIGNIFICANT CHANGE UP (ref 32–36)
MCV RBC AUTO: 94.7 FL — SIGNIFICANT CHANGE UP (ref 80–100)
PLATELET # BLD AUTO: 262 K/UL — SIGNIFICANT CHANGE UP (ref 150–400)
POTASSIUM SERPL-MCNC: 4.2 MMOL/L — SIGNIFICANT CHANGE UP (ref 3.5–5.3)
POTASSIUM SERPL-SCNC: 4.2 MMOL/L — SIGNIFICANT CHANGE UP (ref 3.5–5.3)
RBC # BLD: 3.65 M/UL — LOW (ref 3.8–5.2)
RBC # FLD: 11.9 % — SIGNIFICANT CHANGE UP (ref 10.3–14.5)
SODIUM SERPL-SCNC: 140 MMOL/L — SIGNIFICANT CHANGE UP (ref 135–145)
WBC # BLD: 11.9 K/UL — HIGH (ref 3.8–10.5)
WBC # FLD AUTO: 11.9 K/UL — HIGH (ref 3.8–10.5)

## 2019-01-12 PROCEDURE — 97116 GAIT TRAINING THERAPY: CPT

## 2019-01-12 PROCEDURE — 99232 SBSQ HOSP IP/OBS MODERATE 35: CPT

## 2019-01-12 PROCEDURE — 88311 DECALCIFY TISSUE: CPT

## 2019-01-12 PROCEDURE — 86850 RBC ANTIBODY SCREEN: CPT

## 2019-01-12 PROCEDURE — 97162 PT EVAL MOD COMPLEX 30 MIN: CPT

## 2019-01-12 PROCEDURE — 86900 BLOOD TYPING SEROLOGIC ABO: CPT

## 2019-01-12 PROCEDURE — 80048 BASIC METABOLIC PNL TOTAL CA: CPT

## 2019-01-12 PROCEDURE — 97530 THERAPEUTIC ACTIVITIES: CPT

## 2019-01-12 PROCEDURE — C1776: CPT

## 2019-01-12 PROCEDURE — 86901 BLOOD TYPING SEROLOGIC RH(D): CPT

## 2019-01-12 PROCEDURE — 97110 THERAPEUTIC EXERCISES: CPT

## 2019-01-12 PROCEDURE — 85027 COMPLETE CBC AUTOMATED: CPT

## 2019-01-12 PROCEDURE — 88307 TISSUE EXAM BY PATHOLOGIST: CPT

## 2019-01-12 PROCEDURE — 36415 COLL VENOUS BLD VENIPUNCTURE: CPT

## 2019-01-12 PROCEDURE — C1713: CPT

## 2019-01-12 PROCEDURE — 73562 X-RAY EXAM OF KNEE 3: CPT

## 2019-01-12 RX ORDER — CELECOXIB 200 MG/1
1 CAPSULE ORAL
Qty: 7 | Refills: 0 | OUTPATIENT
Start: 2019-01-12 | End: 2019-01-18

## 2019-01-12 RX ORDER — DOCUSATE SODIUM 100 MG
1 CAPSULE ORAL
Qty: 45 | Refills: 0 | OUTPATIENT
Start: 2019-01-12 | End: 2019-01-26

## 2019-01-12 RX ORDER — ASCORBIC ACID 60 MG
1 TABLET,CHEWABLE ORAL
Qty: 30 | Refills: 0 | OUTPATIENT
Start: 2019-01-12 | End: 2019-02-10

## 2019-01-12 RX ORDER — FOLIC ACID 0.8 MG
1 TABLET ORAL
Qty: 30 | Refills: 0 | OUTPATIENT
Start: 2019-01-12 | End: 2019-02-10

## 2019-01-12 RX ORDER — FERROUS SULFATE 325(65) MG
1 TABLET ORAL
Qty: 45 | Refills: 0 | OUTPATIENT
Start: 2019-01-12 | End: 2019-01-26

## 2019-01-12 RX ORDER — IBUPROFEN 200 MG
1 TABLET ORAL
Qty: 0 | Refills: 0 | COMMUNITY

## 2019-01-12 RX ORDER — ENOXAPARIN SODIUM 100 MG/ML
40 INJECTION SUBCUTANEOUS
Qty: 13 | Refills: 0 | OUTPATIENT
Start: 2019-01-12 | End: 2019-01-24

## 2019-01-12 RX ADMIN — Medication 1 TABLET(S): at 11:57

## 2019-01-12 RX ADMIN — FAMOTIDINE 20 MILLIGRAM(S): 10 INJECTION INTRAVENOUS at 05:27

## 2019-01-12 RX ADMIN — Medication 1 MILLIGRAM(S): at 11:57

## 2019-01-12 RX ADMIN — TRAMADOL HYDROCHLORIDE 50 MILLIGRAM(S): 50 TABLET ORAL at 05:25

## 2019-01-12 RX ADMIN — OXYCODONE HYDROCHLORIDE 5 MILLIGRAM(S): 5 TABLET ORAL at 05:15

## 2019-01-12 RX ADMIN — Medication 500 MILLIGRAM(S): at 05:28

## 2019-01-12 RX ADMIN — ENOXAPARIN SODIUM 30 MILLIGRAM(S): 100 INJECTION SUBCUTANEOUS at 05:28

## 2019-01-12 RX ADMIN — Medication 100 MILLIGRAM(S): at 15:41

## 2019-01-12 RX ADMIN — OXYCODONE HYDROCHLORIDE 5 MILLIGRAM(S): 5 TABLET ORAL at 04:42

## 2019-01-12 RX ADMIN — Medication 325 MILLIGRAM(S): at 08:10

## 2019-01-12 RX ADMIN — Medication 325 MILLIGRAM(S): at 11:58

## 2019-01-12 RX ADMIN — Medication 30 MILLIGRAM(S): at 09:30

## 2019-01-12 RX ADMIN — SODIUM CHLORIDE 3 MILLILITER(S): 9 INJECTION INTRAMUSCULAR; INTRAVENOUS; SUBCUTANEOUS at 05:48

## 2019-01-12 RX ADMIN — TRAMADOL HYDROCHLORIDE 50 MILLIGRAM(S): 50 TABLET ORAL at 06:13

## 2019-01-12 RX ADMIN — Medication 100 MILLIGRAM(S): at 05:27

## 2019-01-12 RX ADMIN — Medication 30 MILLIGRAM(S): at 08:12

## 2019-01-12 NOTE — DISCHARGE NOTE ADULT - PATIENT PORTAL LINK FT
You can access the [facility] Patient Portal, offered by [facility], by registering with the following website: [email or web address]

## 2019-01-12 NOTE — PROGRESS NOTE ADULT - PROBLEM SELECTOR PLAN 1
- continue with tramadol 50mg po q 8 hours  - toradol iv prn  - oxy po prn  - stool softeners   - oob

## 2019-01-12 NOTE — PROGRESS NOTE ADULT - SUBJECTIVE AND OBJECTIVE BOX
53yFemale    Diagnosis:  S/p Right Total Knee Replacement POD#2    Patient was seen and evaluated at bedside. Patient with no acute complaints.   Pain is  well controlled.    Ambulation with PT; Ambulating with walker.    Denies CP/SOB, dyspnea, paresthesias, N/V/D, palpitations.     Vital Signs Last 24 Hrs  T(C): 36.9 (12 Jan 2019 05:24), Max: 37.3 (11 Jan 2019 21:31)  T(F): 98.4 (12 Jan 2019 05:24), Max: 99.2 (11 Jan 2019 21:31)  HR: 92 (12 Jan 2019 05:24) (92 - 96)  BP: 137/88 (12 Jan 2019 05:24) (137/88 - 148/86)  BP(mean): --  RR: 17 (12 Jan 2019 05:24) (17 - 18)  SpO2: 100% (12 Jan 2019 05:24) (99% - 100%)  I&O's Detail      Physical Exam:    General: AAOx3, NAD, resting comfortably in bed.    Right knee:  Dressing is C/D/I. Skin is pink and warm. SILT.  No drainage.   Lower extremities:  No calf tenderness, calves are soft. 2+pulses. NVI. (+)EHL/FHL/ADF/APF intact with 5/5 strength b/l.  Good capillary refill. SILT.                          11.5   11.9  )-----------( 262      ( 12 Jan 2019 07:19 )             34.6     01-12    140  |  106  |  7   ----------------------------<  107<H>  4.2   |  29  |  0.67    Ca    8.3<L>      12 Jan 2019 07:19        Impression:  53yFemale S/p Right Total Knee Replacement POD#2  Plan:  -  Pain management  -  Dvt prophylaxis with Lovenox  -  Daily Physical Therapy:  WBAT of the Right lower extremity with walker  -  Discharge planning: Home   -  Dressing changed today  -  Heel bump to RLE while lying in bed -- heel bump applied  -  Incentive spirometry encouraged.   -  Case d/w Dr. Urban

## 2019-01-12 NOTE — DISCHARGE NOTE ADULT - MEDICATION SUMMARY - MEDICATIONS TO STOP TAKING
I will STOP taking the medications listed below when I get home from the hospital:    naproxen 500 mg oral tablet  -- 1 tab(s) by mouth 2 times a day    ibuprofen 800 mg oral tablet  -- 1 tab(s) by mouth 3 times a day, As Needed

## 2019-01-12 NOTE — DISCHARGE NOTE ADULT - PLAN OF CARE
Increase ROM and improve pain Pain Management- See Attached Medication Reconciliation  **StretchStrap Stretching exercises for Total knee replacement***  Weight Bearing Status: WBAT to RLE with walker  Equipment needs: Commode, Walker  Dressing: Please keep bandage/dressing Clean, Dry, and Intact.  Incision Site: REMOVE STAPLES on 01/25/19  STAPLES AND DRESSING TO BE REMOVED BY NURSE  NURSE TO APPLY STERI-STRIPS TO THE WOUND AFTER STAPLE REMOVAL   Dvt prophylaxis: D/C LOVENOX on 01/25/19  PT/Occupational Therapy are Activities of Daily Living as appropriate  Follow up with Orthopedic Surgeon, Dr. Urban after STAPLES ARE REMOVED in TWO weeks [phone]

## 2019-01-12 NOTE — PROGRESS NOTE ADULT - SUBJECTIVE AND OBJECTIVE BOX
NP Note discussed with  Primary Attending    Patient is a 53y old  Female who presents with a chief complaint of R TKA (12 Jan 2019 11:09).  Pt s.p right knee replacement, pod#2.  pt complaining of right knee pain. Pain worsens with movement.  Pt is oob and ambulating with PT.  Pain radiates to thigh but is controlled with pain meds.        INTERVAL HPI/OVERNIGHT EVENTS: no new complaints    MEDICATIONS  (STANDING):  ascorbic acid 500 milliGRAM(s) Oral two times a day  docusate sodium 100 milliGRAM(s) Oral three times a day  enoxaparin Injectable 30 milliGRAM(s) SubCutaneous every 12 hours  famotidine    Tablet 20 milliGRAM(s) Oral every 12 hours  ferrous    sulfate 325 milliGRAM(s) Oral three times a day with meals  folic acid 1 milliGRAM(s) Oral daily  multivitamin 1 Tablet(s) Oral daily  sodium chloride 0.9% lock flush 3 milliLiter(s) IV Push every 8 hours  sodium chloride 0.9%. 1000 milliLiter(s) (120 mL/Hr) IV Continuous <Continuous>  traMADol 50 milliGRAM(s) Oral every 8 hours    MEDICATIONS  (PRN):  acetaminophen   Tablet .. 650 milliGRAM(s) Oral every 6 hours PRN Temp greater or equal to 38C (100.4F)  aluminum hydroxide/magnesium hydroxide/simethicone Suspension 30 milliLiter(s) Oral four times a day PRN Indigestion  ketorolac   Injectable 30 milliGRAM(s) IV Push every 6 hours PRN breakthrough pain  ondansetron Injectable 4 milliGRAM(s) IV Push every 6 hours PRN Nausea and/or Vomiting  oxyCODONE    IR 5 milliGRAM(s) Oral every 4 hours PRN Mild Pain (1 - 3)      __________________________________________________  REVIEW OF SYSTEMS:    CONSTITUTIONAL: No fever,   RESPIRATORY: No cough; No shortness of breath  CARDIOVASCULAR: No chest pain, no palpitations  GASTROINTESTINAL: No pain. No nausea or vomiting; No diarrhea   NEUROLOGICAL: No headache or numbness, no tremors  MUSCULOSKELETAL: + right knee pain  GENITOURINARY: no dysuria, no frequency, no hesitancy        Vital Signs Last 24 Hrs  T(C): 36.9 (12 Jan 2019 05:24), Max: 37.3 (11 Jan 2019 21:31)  T(F): 98.4 (12 Jan 2019 05:24), Max: 99.2 (11 Jan 2019 21:31)  HR: 92 (12 Jan 2019 05:24) (92 - 96)  BP: 137/88 (12 Jan 2019 05:24) (137/88 - 148/86)  BP(mean): --  RR: 17 (12 Jan 2019 05:24) (17 - 18)  SpO2: 100% (12 Jan 2019 05:24) (99% - 100%)    ________________________________________________  PHYSICAL EXAM:  GENERAL: NAD  CHEST/LUNG: Clear to auscultation bilaterally with good air entry   HEART: S1 S2  regular; no murmurs, gallops or rubs  ABDOMEN: Soft, Nontender, Nondistended; Bowel sounds present  EXTREMITIES: no cyanosis; no edema; no calf tenderness  NERVOUS SYSTEM:  Awake and alert; Oriented  to place, person and time ; no new deficits  MUSCULOSKELETAL: + right knee tenderness, + decreased rom   _________________________________________________  LABS:                        11.5   11.9  )-----------( 262      ( 12 Jan 2019 07:19 )             34.6     01-12    140  |  106  |  7   ----------------------------<  107<H>  4.2   |  29  |  0.67    Ca    8.3<L>      12 Jan 2019 07:19          CAPILLARY BLOOD GLUCOSE            RADIOLOGY & ADDITIONAL TESTS:    Imaging Personally Reviewed:  YES/NO    Consultant(s) Notes Reviewed:   YES/ No    Care Discussed with Consultants :     Plan of care was discussed with patient and /or primary care giver; all questions and concerns were addressed and care was aligned with patient's wishes.

## 2019-01-12 NOTE — DISCHARGE NOTE ADULT - CARE PLAN
Principal Discharge DX:	Primary osteoarthritis of right knee  Goal:	Increase ROM and improve pain  Assessment and plan of treatment:	Pain Management- See Attached Medication Reconciliation  **StretchStrap Stretching exercises for Total knee replacement***  Weight Bearing Status: WBAT to RLE with walker  Equipment needs: Commode, Walker  Dressing: Please keep bandage/dressing Clean, Dry, and Intact.  Incision Site: REMOVE STAPLES on 01/25/19  STAPLES AND DRESSING TO BE REMOVED BY NURSE  NURSE TO APPLY STERI-STRIPS TO THE WOUND AFTER STAPLE REMOVAL   Dvt prophylaxis: D/C LOVENOX on 01/25/19  PT/Occupational Therapy are Activities of Daily Living as appropriate  Follow up with Orthopedic Surgeon, Dr. Urban after STAPLES ARE REMOVED in TWO weeks [phone]

## 2019-01-12 NOTE — DISCHARGE NOTE ADULT - CARE PROVIDER_API CALL
Catalino Urban), Orthopaedic Surgery  [address] [place] [address]  Phone: [phone]  Fax: [phone]

## 2019-01-12 NOTE — DISCHARGE NOTE ADULT - MEDICATION SUMMARY - MEDICATIONS TO TAKE
I will START or STAY ON the medications listed below when I get home from the hospital:    Percocet 5/325 oral tablet  -- 1-2  tab(s) by mouth every 6 hours, As Needed- for moderate pain MDD:7  -- Caution federal law prohibits the transfer of this drug to any person other  than the person for whom it was prescribed.  May cause drowsiness.  Alcohol may intensify this effect.  Use care when operating dangerous machinery.  This prescription cannot be refilled.  This product contains acetaminophen.  Do not use  with any other product containing acetaminophen to prevent possible liver damage.  Using more of this medication than prescribed may cause serious breathing problems.    -- Indication: For Moderate pain    CeleBREX 200 mg oral capsule  -- 1 cap(s) by mouth once a day MDD:1  -- Do not take this drug if you are pregnant.  Medication should be taken with plenty of water.  Obtain medical advice before taking any non-prescription drugs as some may affect the action of this medication.  Take with food or milk.    -- Indication: For Pain due to total right knee replacement    Lovenox 40 mg/0.4 mL injectable solution  -- 40 milligram(s) subcutaneously once a day MDD:1   -- It is very important that you take or use this exactly as directed.  Do not skip doses or discontinue unless directed by your doctor.    -- Indication: For DVT prophylaxis    ferrous sulfate 325 mg (65 mg elemental iron) oral tablet  -- 1 tab(s) by mouth 3 times a day x 15 days MDD:3  -- Check with your doctor before becoming pregnant.  Do not chew, break, or crush.  May discolor urine or feces.    -- Indication: For Anemia    Colace 100 mg oral capsule  -- 1 cap(s) by mouth 3 times a day, As Needed- for constipation MDD:3  -- Medication should be taken with plenty of water.    -- Indication: For Constipation    Vitamin C 500 mg oral tablet  -- 1 tab(s) by mouth once a day MDD:1  -- Indication: For Vitamin    folic acid 1 mg oral tablet  -- 1 tab(s) by mouth once a day MDD:1  -- Indication: For Vitamin

## 2019-01-15 LAB — SURGICAL PATHOLOGY STUDY: SIGNIFICANT CHANGE UP

## 2021-06-06 NOTE — PHYSICAL THERAPY INITIAL EVALUATION ADULT - PERSONAL SAFETY AND JUDGMENT, REHAB EVAL
Pt called because she has a appt with Dr Brown on Thursday. She is wondering if she needs to come and what for and if labs are needed. She is reluctant to come into the hospital environment because of the coronavirus. Hawa Gil RN   intact

## 2024-07-01 NOTE — ASU PREOP CHECKLIST - LAST TOOK
Patient called requesting results of stress test completed last week.  She is having back surgery on 7/3 (Dr Chavo Patel--[facility]).  Results need to be faxed to him as soon as reviewed (fax number [phone]).  Patient has pre op tomorrow with her PCP. Will reach out to ATILIO Corona.  CLEMENTINA Sommer   solids

## 2024-11-07 NOTE — H&P PST ADULT - NSWEIGHTCALCTOOLDRUG_GEN_A_CORE
Watch for signs of infection; redness, swelling, fever, chills or heat, report such symptoms to the MD. No driving while taking pain medication, it causes drowsiness & constipation. Drink 6-8 glasses of fluids daily to promote hydration. No heavy lifting, pulling or pushing heavy objects. Follow up with the MD 
 used